# Patient Record
Sex: MALE | Race: WHITE | NOT HISPANIC OR LATINO | Employment: UNEMPLOYED | URBAN - NONMETROPOLITAN AREA
[De-identification: names, ages, dates, MRNs, and addresses within clinical notes are randomized per-mention and may not be internally consistent; named-entity substitution may affect disease eponyms.]

---

## 2023-09-28 ENCOUNTER — OFFICE VISIT (OUTPATIENT)
Dept: FAMILY MEDICINE CLINIC | Facility: CLINIC | Age: 33
End: 2023-09-28
Payer: COMMERCIAL

## 2023-09-28 VITALS
DIASTOLIC BLOOD PRESSURE: 76 MMHG | HEART RATE: 80 BPM | OXYGEN SATURATION: 98 % | WEIGHT: 247 LBS | SYSTOLIC BLOOD PRESSURE: 138 MMHG | HEIGHT: 69 IN | BODY MASS INDEX: 36.58 KG/M2

## 2023-09-28 DIAGNOSIS — Z11.59 NEED FOR HEPATITIS C SCREENING TEST: ICD-10-CM

## 2023-09-28 DIAGNOSIS — Z23 ENCOUNTER FOR IMMUNIZATION: ICD-10-CM

## 2023-09-28 DIAGNOSIS — R19.5 LOOSE STOOLS: ICD-10-CM

## 2023-09-28 DIAGNOSIS — Z11.4 SCREENING FOR HIV (HUMAN IMMUNODEFICIENCY VIRUS): ICD-10-CM

## 2023-09-28 DIAGNOSIS — Z00.00 PHYSICAL EXAM, ANNUAL: Primary | ICD-10-CM

## 2023-09-28 PROCEDURE — 99385 PREV VISIT NEW AGE 18-39: CPT | Performed by: STUDENT IN AN ORGANIZED HEALTH CARE EDUCATION/TRAINING PROGRAM

## 2023-09-28 PROCEDURE — 99203 OFFICE O/P NEW LOW 30 MIN: CPT | Performed by: STUDENT IN AN ORGANIZED HEALTH CARE EDUCATION/TRAINING PROGRAM

## 2023-09-28 NOTE — PROGRESS NOTES
ADULT ANNUAL [address] [place] [address] PRIMARY CARE    NAME: Billy Urbano  AGE: 29 y.o. SEX: male  : 1990     DATE: 2023     Assessment and Plan:     Problem List Items Addressed This Visit    None  Visit Diagnoses     Physical exam, annual    -  Primary    Relevant Orders    CBC and differential    Basic metabolic panel    Lipid panel    HEMOGLOBIN A1C W/ EAG ESTIMATION    Need for hepatitis C screening test        Relevant Orders    Hepatitis C Antibody    Screening for HIV (human immunodeficiency virus)        Relevant Orders    HIV 1/2 AG/AB w Reflex [facility] for 2 yr old and above    Encounter for immunization        Loose stools        Relevant Orders    Celiac Disease Antibody Profile    H. pylori antigen, stool          Immunizations and preventive care screenings were discussed with patient today. Appropriate education was printed on patient's after visit summary. Counseling:  Alcohol/drug use: discussed moderation in alcohol intake, the recommendations for healthy alcohol use, and avoidance of illicit drug use. Dental Health: discussed importance of regular tooth brushing, flossing, and dental visits. Injury prevention: discussed safety/seat belts, safety helmets, smoke detectors, carbon dioxide detectors, and smoking near bedding or upholstery. Sexual health: discussed sexually transmitted diseases, partner selection, use of condoms, avoidance of unintended pregnancy, and contraceptive alternatives. · Exercise: the importance of regular exercise/physical activity was discussed. Recommend exercise 3-5 times per week for at least 30 minutes. No follow-ups on file. Chief Complaint:     Chief Complaint   Patient presents with   • Establish Care   • Diarrhea      History of Present Illness:     Adult Annual Physical   Patient here for a comprehensive physical exam. The patient reports problems - abdominal pain.   Loose stools, in the morning 3 x week, no change in appetite or weight loss. OTC- motrin 2 x a week. Diet and Physical Activity  · Diet/Nutrition: well balanced diet, heart healthy (low sodium) diet and limited junk food. · Exercise: no formal exercise. BMI Counseling: There is no height or weight on file to calculate BMI. The BMI is above normal. Nutrition recommendations include reducing portion sizes and 3-5 servings of fruits/vegetables daily. Exercise recommendations include vigorous aerobic physical activity for 75 minutes/week and joining a gym. Depression Screening  PHQ-2/9 Depression Screening         General Health  · Sleep: sleeps poorly and gets 4-6 hours of sleep on average. · Hearing: normal - bilateral.  · Vision: no vision problems. · Dental: no dental visits for >1 year.  Health  · History of STDs?: no.     Review of Systems:     Review of Systems   Constitutional: Negative for chills, diaphoresis, fatigue and unexpected weight change. HENT: Negative for congestion and drooling. Eyes: Negative for visual disturbance. Respiratory: Negative for apnea, choking, shortness of breath and wheezing. Cardiovascular: Negative for chest pain, palpitations and leg swelling. Gastrointestinal: Positive for diarrhea. Negative for abdominal pain, nausea and vomiting. Endocrine: Negative for polydipsia and polyuria. Genitourinary: Negative for decreased urine volume and dysuria. Neurological: Negative for weakness and headaches. Past Medical History:     No past medical history on file. Past Surgical History:     No past surgical history on file.    Social History:     Social History     Socioeconomic History   • Marital status: /Civil Union     Spouse name: Not on file   • Number of children: Not on file   • Years of education: Not on file   • Highest education level: Not on file   Occupational History   • Not on file   Tobacco Use   • Smoking status: Not on file   • Smokeless tobacco: Not on file   Substance and Sexual Activity   • Alcohol use: Not on file   • Drug use: Not on file   • Sexual activity: Not on file   Other Topics Concern   • Not on file   Social History Narrative   • Not on file     Social Determinants of Health     Financial Resource Strain: Not on file   Food Insecurity: Not on file   Transportation Needs: Not on file   Physical Activity: Not on file   Stress: Not on file   Social Connections: Not on file   Intimate Partner Violence: Not on file   Housing Stability: Not on file      Family History:     No family history on file. Current Medications:     No current outpatient medications on file. No current facility-administered medications for this visit. Allergies:     No Known Allergies   Physical Exam:     There were no vitals taken for this visit. Physical Exam  Constitutional:       General: He is not in acute distress. Appearance: He is not toxic-appearing or diaphoretic. HENT:      Head: Normocephalic and atraumatic. Nose: No congestion or rhinorrhea. Cardiovascular:      Rate and Rhythm: Normal rate and regular rhythm. Pulses: Normal pulses. Heart sounds: Normal heart sounds. No murmur heard. No friction rub. No gallop. Pulmonary:      Effort: Pulmonary effort is normal. No respiratory distress. Breath sounds: Normal breath sounds. No stridor. No wheezing, rhonchi or rales. Chest:      Chest wall: No tenderness. Abdominal:      General: Bowel sounds are normal. There is no distension. Palpations: There is no mass. Tenderness: There is no abdominal tenderness. There is no right CVA tenderness, left CVA tenderness or guarding. Musculoskeletal:      Right lower leg: No edema. Left lower leg: No edema. Skin:     Findings: No lesion or rash. Neurological:      General: No focal deficit present. Mental Status: He is oriented to person, place, and time. Mental status is at baseline. Psychiatric:         Mood and Affect: Mood normal.         Behavior: Behavior normal.          Tammie Bahena MD   [address] [facility]

## 2024-03-12 ENCOUNTER — TELEPHONE (OUTPATIENT)
Dept: FAMILY MEDICINE CLINIC | Facility: CLINIC | Age: 34
End: 2024-03-12

## 2024-03-12 NOTE — LETTER
Date: 3/12/2024    Tk Vargas  [address] [place] [id number]    Dear Tk Vargas,      We have attempted to reach you regarding your upcoming appointment on 3/28 with Dr. Carter.     Unfortunately, due to a change in the provider's schedule we need to change your appointment. Please call our office as soon as possible so we can reschedule your appointment. We apologize for any inconvenience this may cause.     Thank you in advance for your cooperation and assistance.          Sincerely,  Siobhan Mclean

## 2024-03-12 NOTE — TELEPHONE ENCOUNTER
Called patient to reschedule his 3/28 appointment, left voicemail. However his voicemail message states that he does not monitor his voicemails and to text instead. As that is not an option a letter will be mailed out.

## 2024-03-13 ENCOUNTER — NURSE TRIAGE (OUTPATIENT)
Dept: OTHER | Facility: OTHER | Age: 34
End: 2024-03-13

## 2024-03-13 NOTE — TELEPHONE ENCOUNTER
"Regarding: fever, chills, body ache, weakness  ----- Message from Tyra Francis sent at 3/13/2024  6:41 PM EDT -----  \" My  has been sick for a couple of days now. He has the chills, fevers, body aches and he is very weak\"    "

## 2024-03-13 NOTE — TELEPHONE ENCOUNTER
"Reason for Disposition   [1] Sinus pain (not just congestion) AND [2] fever    Answer Assessment - Initial Assessment Questions  1. ONSET: \"When did the nasal discharge start?\"       Just congestion    2. AMOUNT: \"How much discharge is there?\"       minimal    3. COUGH: \"Do you have a cough?\" If yes, ask: \"Describe the color of your sputum\" (clear, white, yellow, green)      Green sputum    4. RESPIRATORY DISTRESS: \"Describe your breathing.\"       No SOB    5. FEVER: \"Do you have a fever?\" If Yes, ask: \"What is your temperature, how was it measured, and when did it start?\"      Tmax today 102    6. SEVERITY: \"Overall, how bad are you feeling right now?\" (e.g., doesn't interfere with normal activities, staying home from school/work, staying in bed)       Generalized weakness, takes a lot of energy for him to do anything    7. OTHER SYMPTOMS: \"Do you have any other symptoms?\" (e.g., sore throat, earache, wheezing, vomiting)      +sore throat, no difficulty swallowing      Has family history of hypertension    Protocols used: Common Cold-ADULT-      Pt agreeable to appointment tomorrow morning. Advised ED or Urgent care if worsens. Would like staff to know he cannot tolerate any nasal swabs.  "

## 2024-03-14 NOTE — TELEPHONE ENCOUNTER
It appears that patient had scheduled an appointment for today, 3/14, but then cancelled it this morning.

## 2024-06-03 ENCOUNTER — TELEPHONE (OUTPATIENT)
Dept: DENTISTRY | Facility: CLINIC | Age: 34
End: 2024-06-03